# Patient Record
Sex: MALE | Race: OTHER | HISPANIC OR LATINO
[De-identification: names, ages, dates, MRNs, and addresses within clinical notes are randomized per-mention and may not be internally consistent; named-entity substitution may affect disease eponyms.]

---

## 2018-10-23 PROBLEM — Z00.00 ENCOUNTER FOR PREVENTIVE HEALTH EXAMINATION: Status: ACTIVE | Noted: 2018-10-23

## 2018-11-17 ENCOUNTER — APPOINTMENT (OUTPATIENT)
Dept: OPHTHALMOLOGY | Facility: CLINIC | Age: 53
End: 2018-11-17

## 2021-03-29 ENCOUNTER — INPATIENT (INPATIENT)
Facility: HOSPITAL | Age: 56
LOS: 0 days | Discharge: HOME | DRG: 177 | End: 2021-03-30
Attending: STUDENT IN AN ORGANIZED HEALTH CARE EDUCATION/TRAINING PROGRAM | Admitting: STUDENT IN AN ORGANIZED HEALTH CARE EDUCATION/TRAINING PROGRAM
Payer: MEDICAID

## 2021-03-29 VITALS
OXYGEN SATURATION: 96 % | TEMPERATURE: 103 F | SYSTOLIC BLOOD PRESSURE: 138 MMHG | WEIGHT: 149.91 LBS | DIASTOLIC BLOOD PRESSURE: 81 MMHG | HEART RATE: 97 BPM | RESPIRATION RATE: 18 BRPM | HEIGHT: 64 IN

## 2021-03-29 PROCEDURE — 99285 EMERGENCY DEPT VISIT HI MDM: CPT | Mod: 25

## 2021-03-29 PROCEDURE — 93010 ELECTROCARDIOGRAM REPORT: CPT

## 2021-03-30 ENCOUNTER — TRANSCRIPTION ENCOUNTER (OUTPATIENT)
Age: 56
End: 2021-03-30

## 2021-03-30 VITALS
DIASTOLIC BLOOD PRESSURE: 76 MMHG | SYSTOLIC BLOOD PRESSURE: 113 MMHG | OXYGEN SATURATION: 96 % | TEMPERATURE: 98 F | RESPIRATION RATE: 18 BRPM | HEART RATE: 88 BPM

## 2021-03-30 DIAGNOSIS — U07.1 COVID-19: ICD-10-CM

## 2021-03-30 DIAGNOSIS — J96.91 RESPIRATORY FAILURE, UNSPECIFIED WITH HYPOXIA: ICD-10-CM

## 2021-03-30 DIAGNOSIS — Z29.9 ENCOUNTER FOR PROPHYLACTIC MEASURES, UNSPECIFIED: ICD-10-CM

## 2021-03-30 DIAGNOSIS — J18.9 PNEUMONIA, UNSPECIFIED ORGANISM: ICD-10-CM

## 2021-03-30 LAB
ALBUMIN SERPL ELPH-MCNC: 4 G/DL — SIGNIFICANT CHANGE UP (ref 3.3–5)
ALP SERPL-CCNC: 66 U/L — SIGNIFICANT CHANGE UP (ref 40–120)
ALT FLD-CCNC: 61 U/L — HIGH (ref 10–45)
ANION GAP SERPL CALC-SCNC: 14 MMOL/L — SIGNIFICANT CHANGE UP (ref 5–17)
APTT BLD: 35.1 SEC — SIGNIFICANT CHANGE UP (ref 27.5–35.5)
AST SERPL-CCNC: 75 U/L — HIGH (ref 10–40)
BASE EXCESS BLDV CALC-SCNC: 4.6 MMOL/L — HIGH (ref -2–2)
BASOPHILS # BLD AUTO: 0.01 K/UL — SIGNIFICANT CHANGE UP (ref 0–0.2)
BASOPHILS NFR BLD AUTO: 0.2 % — SIGNIFICANT CHANGE UP (ref 0–2)
BILIRUB SERPL-MCNC: 0.4 MG/DL — SIGNIFICANT CHANGE UP (ref 0.2–1.2)
BUN SERPL-MCNC: 11 MG/DL — SIGNIFICANT CHANGE UP (ref 7–23)
CA-I SERPL-SCNC: 1.03 MMOL/L — LOW (ref 1.12–1.3)
CALCIUM SERPL-MCNC: 8.5 MG/DL — SIGNIFICANT CHANGE UP (ref 8.4–10.5)
CHLORIDE BLDV-SCNC: 105 MMOL/L — SIGNIFICANT CHANGE UP (ref 96–108)
CHLORIDE SERPL-SCNC: 96 MMOL/L — SIGNIFICANT CHANGE UP (ref 96–108)
CO2 BLDV-SCNC: 31 MMOL/L — HIGH (ref 22–30)
CO2 SERPL-SCNC: 25 MMOL/L — SIGNIFICANT CHANGE UP (ref 22–31)
CREAT SERPL-MCNC: 0.71 MG/DL — SIGNIFICANT CHANGE UP (ref 0.5–1.3)
CRP SERPL-MCNC: 96 MG/L — HIGH (ref 0–4)
D DIMER BLD IA.RAPID-MCNC: 221 NG/ML DDU — SIGNIFICANT CHANGE UP
EOSINOPHIL # BLD AUTO: 0 K/UL — SIGNIFICANT CHANGE UP (ref 0–0.5)
EOSINOPHIL NFR BLD AUTO: 0 % — SIGNIFICANT CHANGE UP (ref 0–6)
FERRITIN SERPL-MCNC: 1558 NG/ML — HIGH (ref 30–400)
GAS PNL BLDV: 135 MMOL/L — SIGNIFICANT CHANGE UP (ref 135–145)
GAS PNL BLDV: SIGNIFICANT CHANGE UP
GAS PNL BLDV: SIGNIFICANT CHANGE UP
GLUCOSE BLDV-MCNC: 118 MG/DL — HIGH (ref 70–99)
GLUCOSE SERPL-MCNC: 123 MG/DL — HIGH (ref 70–99)
HCO3 BLDV-SCNC: 29 MMOL/L — SIGNIFICANT CHANGE UP (ref 21–29)
HCT VFR BLD CALC: 42.6 % — SIGNIFICANT CHANGE UP (ref 39–50)
HCT VFR BLDA CALC: 48 % — SIGNIFICANT CHANGE UP (ref 39–50)
HGB BLD CALC-MCNC: 15.7 G/DL — SIGNIFICANT CHANGE UP (ref 13–17)
HGB BLD-MCNC: 14.8 G/DL — SIGNIFICANT CHANGE UP (ref 13–17)
IMM GRANULOCYTES NFR BLD AUTO: 0.5 % — SIGNIFICANT CHANGE UP (ref 0–1.5)
INR BLD: 1.04 RATIO — SIGNIFICANT CHANGE UP (ref 0.88–1.16)
LACTATE BLDV-MCNC: 1.4 MMOL/L — SIGNIFICANT CHANGE UP (ref 0.7–2)
LYMPHOCYTES # BLD AUTO: 0.68 K/UL — LOW (ref 1–3.3)
LYMPHOCYTES # BLD AUTO: 10.7 % — LOW (ref 13–44)
MCHC RBC-ENTMCNC: 30.6 PG — SIGNIFICANT CHANGE UP (ref 27–34)
MCHC RBC-ENTMCNC: 34.7 GM/DL — SIGNIFICANT CHANGE UP (ref 32–36)
MCV RBC AUTO: 88.2 FL — SIGNIFICANT CHANGE UP (ref 80–100)
MONOCYTES # BLD AUTO: 0.32 K/UL — SIGNIFICANT CHANGE UP (ref 0–0.9)
MONOCYTES NFR BLD AUTO: 5 % — SIGNIFICANT CHANGE UP (ref 2–14)
NEUTROPHILS # BLD AUTO: 5.3 K/UL — SIGNIFICANT CHANGE UP (ref 1.8–7.4)
NEUTROPHILS NFR BLD AUTO: 83.6 % — HIGH (ref 43–77)
NRBC # BLD: 0 /100 WBCS — SIGNIFICANT CHANGE UP (ref 0–0)
PCO2 BLDV: 45 MMHG — SIGNIFICANT CHANGE UP (ref 42–55)
PH BLDV: 7.43 — SIGNIFICANT CHANGE UP (ref 7.35–7.45)
PLATELET # BLD AUTO: 126 K/UL — LOW (ref 150–400)
PO2 BLDV: <20 MMHG — LOW (ref 25–45)
POTASSIUM BLDV-SCNC: 3.1 MMOL/L — LOW (ref 3.5–5.3)
POTASSIUM SERPL-MCNC: 3.3 MMOL/L — LOW (ref 3.5–5.3)
POTASSIUM SERPL-MCNC: 4 MMOL/L — SIGNIFICANT CHANGE UP (ref 3.5–5.3)
POTASSIUM SERPL-SCNC: 3.3 MMOL/L — LOW (ref 3.5–5.3)
POTASSIUM SERPL-SCNC: 4 MMOL/L — SIGNIFICANT CHANGE UP (ref 3.5–5.3)
PROCALCITONIN SERPL-MCNC: 0.14 NG/ML — HIGH (ref 0.02–0.1)
PROT SERPL-MCNC: 7.2 G/DL — SIGNIFICANT CHANGE UP (ref 6–8.3)
PROTHROM AB SERPL-ACNC: 12.5 SEC — SIGNIFICANT CHANGE UP (ref 10.6–13.6)
RBC # BLD: 4.83 M/UL — SIGNIFICANT CHANGE UP (ref 4.2–5.8)
RBC # FLD: 11.6 % — SIGNIFICANT CHANGE UP (ref 10.3–14.5)
SAO2 % BLDV: 28 % — LOW (ref 67–88)
SARS-COV-2 RNA SPEC QL NAA+PROBE: DETECTED
SODIUM SERPL-SCNC: 135 MMOL/L — SIGNIFICANT CHANGE UP (ref 135–145)
TROPONIN T, HIGH SENSITIVITY RESULT: <6 NG/L — SIGNIFICANT CHANGE UP (ref 0–51)
WBC # BLD: 6.34 K/UL — SIGNIFICANT CHANGE UP (ref 3.8–10.5)
WBC # FLD AUTO: 6.34 K/UL — SIGNIFICANT CHANGE UP (ref 3.8–10.5)

## 2021-03-30 PROCEDURE — 82803 BLOOD GASES ANY COMBINATION: CPT

## 2021-03-30 PROCEDURE — 85610 PROTHROMBIN TIME: CPT

## 2021-03-30 PROCEDURE — 80053 COMPREHEN METABOLIC PANEL: CPT

## 2021-03-30 PROCEDURE — 87040 BLOOD CULTURE FOR BACTERIA: CPT

## 2021-03-30 PROCEDURE — 86140 C-REACTIVE PROTEIN: CPT

## 2021-03-30 PROCEDURE — 85018 HEMOGLOBIN: CPT

## 2021-03-30 PROCEDURE — 84145 PROCALCITONIN (PCT): CPT

## 2021-03-30 PROCEDURE — 84295 ASSAY OF SERUM SODIUM: CPT

## 2021-03-30 PROCEDURE — 82435 ASSAY OF BLOOD CHLORIDE: CPT

## 2021-03-30 PROCEDURE — 71045 X-RAY EXAM CHEST 1 VIEW: CPT | Mod: 26

## 2021-03-30 PROCEDURE — 99285 EMERGENCY DEPT VISIT HI MDM: CPT | Mod: 25

## 2021-03-30 PROCEDURE — 87635 SARS-COV-2 COVID-19 AMP PRB: CPT

## 2021-03-30 PROCEDURE — 85730 THROMBOPLASTIN TIME PARTIAL: CPT

## 2021-03-30 PROCEDURE — 82330 ASSAY OF CALCIUM: CPT

## 2021-03-30 PROCEDURE — 85025 COMPLETE CBC W/AUTO DIFF WBC: CPT

## 2021-03-30 PROCEDURE — 12345: CPT | Mod: NC

## 2021-03-30 PROCEDURE — 84484 ASSAY OF TROPONIN QUANT: CPT

## 2021-03-30 PROCEDURE — 99223 1ST HOSP IP/OBS HIGH 75: CPT

## 2021-03-30 PROCEDURE — 85379 FIBRIN DEGRADATION QUANT: CPT

## 2021-03-30 PROCEDURE — 71045 X-RAY EXAM CHEST 1 VIEW: CPT

## 2021-03-30 PROCEDURE — 85014 HEMATOCRIT: CPT

## 2021-03-30 PROCEDURE — 82728 ASSAY OF FERRITIN: CPT

## 2021-03-30 PROCEDURE — 83605 ASSAY OF LACTIC ACID: CPT

## 2021-03-30 PROCEDURE — 84132 ASSAY OF SERUM POTASSIUM: CPT

## 2021-03-30 PROCEDURE — 82947 ASSAY GLUCOSE BLOOD QUANT: CPT

## 2021-03-30 PROCEDURE — 96374 THER/PROPH/DIAG INJ IV PUSH: CPT

## 2021-03-30 RX ORDER — REMDESIVIR 5 MG/ML
INJECTION INTRAVENOUS
Refills: 0 | Status: DISCONTINUED | OUTPATIENT
Start: 2021-03-30 | End: 2021-03-30

## 2021-03-30 RX ORDER — GUAIFENESIN/DEXTROMETHORPHAN 600MG-30MG
10 TABLET, EXTENDED RELEASE 12 HR ORAL
Qty: 0 | Refills: 0 | DISCHARGE
Start: 2021-03-30

## 2021-03-30 RX ORDER — REMDESIVIR 5 MG/ML
200 INJECTION INTRAVENOUS EVERY 24 HOURS
Refills: 0 | Status: DISCONTINUED | OUTPATIENT
Start: 2021-03-30 | End: 2021-03-30

## 2021-03-30 RX ORDER — GUAIFENESIN/DEXTROMETHORPHAN 600MG-30MG
10 TABLET, EXTENDED RELEASE 12 HR ORAL EVERY 4 HOURS
Refills: 0 | Status: DISCONTINUED | OUTPATIENT
Start: 2021-03-30 | End: 2021-03-30

## 2021-03-30 RX ORDER — CEFTRIAXONE 500 MG/1
1000 INJECTION, POWDER, FOR SOLUTION INTRAMUSCULAR; INTRAVENOUS ONCE
Refills: 0 | Status: COMPLETED | OUTPATIENT
Start: 2021-03-30 | End: 2021-03-30

## 2021-03-30 RX ORDER — AZITHROMYCIN 500 MG/1
500 TABLET, FILM COATED ORAL ONCE
Refills: 0 | Status: COMPLETED | OUTPATIENT
Start: 2021-03-30 | End: 2021-03-30

## 2021-03-30 RX ORDER — ACETAMINOPHEN 500 MG
2 TABLET ORAL
Qty: 0 | Refills: 0 | DISCHARGE
Start: 2021-03-30

## 2021-03-30 RX ORDER — POTASSIUM CHLORIDE 20 MEQ
40 PACKET (EA) ORAL ONCE
Refills: 0 | Status: COMPLETED | OUTPATIENT
Start: 2021-03-30 | End: 2021-03-30

## 2021-03-30 RX ORDER — KETOROLAC TROMETHAMINE 30 MG/ML
30 SYRINGE (ML) INJECTION ONCE
Refills: 0 | Status: DISCONTINUED | OUTPATIENT
Start: 2021-03-30 | End: 2021-03-30

## 2021-03-30 RX ORDER — DEXAMETHASONE 0.5 MG/5ML
1 ELIXIR ORAL
Qty: 4 | Refills: 0
Start: 2021-03-30 | End: 2021-04-02

## 2021-03-30 RX ORDER — DEXAMETHASONE 0.5 MG/5ML
6 ELIXIR ORAL DAILY
Refills: 0 | Status: DISCONTINUED | OUTPATIENT
Start: 2021-03-30 | End: 2021-03-30

## 2021-03-30 RX ORDER — ENOXAPARIN SODIUM 100 MG/ML
40 INJECTION SUBCUTANEOUS DAILY
Refills: 0 | Status: DISCONTINUED | OUTPATIENT
Start: 2021-03-30 | End: 2021-03-30

## 2021-03-30 RX ORDER — ACETAMINOPHEN 500 MG
650 TABLET ORAL EVERY 4 HOURS
Refills: 0 | Status: DISCONTINUED | OUTPATIENT
Start: 2021-03-30 | End: 2021-03-30

## 2021-03-30 RX ORDER — REMDESIVIR 5 MG/ML
100 INJECTION INTRAVENOUS EVERY 24 HOURS
Refills: 0 | Status: DISCONTINUED | OUTPATIENT
Start: 2021-03-31 | End: 2021-03-30

## 2021-03-30 RX ORDER — DEXAMETHASONE 0.5 MG/5ML
6 ELIXIR ORAL ONCE
Refills: 0 | Status: COMPLETED | OUTPATIENT
Start: 2021-03-30 | End: 2021-03-30

## 2021-03-30 RX ADMIN — AZITHROMYCIN 250 MILLIGRAM(S): 500 TABLET, FILM COATED ORAL at 04:25

## 2021-03-30 RX ADMIN — Medication 30 MILLIGRAM(S): at 03:47

## 2021-03-30 RX ADMIN — Medication 6 MILLIGRAM(S): at 01:14

## 2021-03-30 RX ADMIN — Medication 40 MILLIEQUIVALENT(S): at 03:47

## 2021-03-30 RX ADMIN — CEFTRIAXONE 100 MILLIGRAM(S): 500 INJECTION, POWDER, FOR SOLUTION INTRAMUSCULAR; INTRAVENOUS at 03:46

## 2021-03-30 RX ADMIN — Medication 6 MILLIGRAM(S): at 06:12

## 2021-03-30 NOTE — DISCHARGE NOTE PROVIDER - NSDCACTIVITY_GEN_ALL_CORE
Bathing allowed/Do not drive or operate machinery/Showering allowed/Stairs allowed/No heavy lifting/straining

## 2021-03-30 NOTE — ED ADULT NURSE NOTE - OBJECTIVE STATEMENT
54 yo M presents to ED with complaints of SOB, fever, chills, fatigue. No known past medical history. Pt tested positive for COVID on 3/23, and states his symptoms have been getting worse, not better. Pt has a headache, worsening shortness of breath with ambulation. Pt has been febrile, taking tylenol at home. Denies chest pain, nausea, vomiting, diarrhea. Pt placed on cardiac and SpO2 monitor. IV placed, labs drawn. Ambulatory SpO2 performed, oxygen saturation dropped to 90% with ambulation. Bed locked in lowest position, pt given call bell. Airborne precautions maintained.

## 2021-03-30 NOTE — DISCHARGE NOTE PROVIDER - NSDCMRMEDTOKEN_GEN_ALL_CORE_FT
acetaminophen 325 mg oral tablet: 2 tab(s) orally every 4 hours, As needed, Temp greater or equal to 38.5C (101.3F)  dexamethasone 6 mg oral tablet: 1 tab(s) orally once a day starting 3/31 MDD:1  guaifenesin-dextromethorphan 100 mg-10 mg/5 mL oral liquid: 10 milliliter(s) orally every 4 hours, As needed, Cough

## 2021-03-30 NOTE — H&P ADULT - NSHPPHYSICALEXAM_GEN_ALL_CORE
Vital Signs Last 24 Hrs  T(C): 37.2 (30 Mar 2021 04:41), Max: 39.3 (29 Mar 2021 23:08)  T(F): 98.9 (30 Mar 2021 04:41), Max: 102.8 (29 Mar 2021 23:08)  HR: 86 (30 Mar 2021 04:41) (86 - 102)  BP: 112/69 (30 Mar 2021 04:41) (104/75 - 138/81)  BP(mean): --  RR: 22 (30 Mar 2021 04:41) (18 - 24)  SpO2: 96% (30 Mar 2021 04:41) (90% - 98%)    GENERAL: No acute distress, well-developed  HEAD:  Atraumatic, Normocephalic  ENT: EOMI, PERRLA, conjunctiva and sclera clear,  moist mucosa no pharyngeal erythema or exudates   NECK: supple , no JVD   CHEST/LUNG: Clear to auscultation bilaterally; No wheeze, equal breath sounds bilaterally   BACK: No spinal tenderness,  No CVA tenderness   HEART: Regular rate and rhythm; No murmurs, rubs, or gallops  ABDOMEN: Soft, Nontender, Nondistended; Bowel sounds present  EXTREMITIES:  No clubbing, cyanosis, or edema  MSK: No joint swelling or effusions, ROM intact   PSYCH: Normal behavior/affect  NEUROLOGY: AAOx3, non-focal, cranial nerves intact  SKIN: Normal color, No rashes or lesions

## 2021-03-30 NOTE — H&P ADULT - PROBLEM SELECTOR PLAN 3
procal not markedly elevated more c/w covid , will obtain pa/lat to better clarify lesion , s/p ceftriaxone and azithromycin   - f/u PA/LAt CXR, if suggestive of bacterial pna , then can continue ABX

## 2021-03-30 NOTE — H&P ADULT - PROBLEM SELECTOR PLAN 2
-Continue Dexamethaone 6mg x 10 day course  - start Remdesivir   - monitor liver tests   - Airborne + contact Isolation   - Observe off antibiotics   - Guaifenesin/ dextromethorphan PRN

## 2021-03-30 NOTE — DISCHARGE NOTE NURSING/CASE MANAGEMENT/SOCIAL WORK - NSDPDISTO_GEN_ALL_CORE
[facility] at bedside.   
Dr. Rosario at bedside speaking to patient. [place] [employer] x 1 at bedside. [facility] has been contacted regarding patient's level of consciousness.   
Patient has been placed on an Emergency halfway per ACS.   
Patient not tolerating oral charcoal well. Holding charcoal in her mouth for extended periods of time. After initial sips of charcoal swallowed, patient seemed to be gagging as if she were going to vomit. Patient denies nausea. Dr. Julian notified.   
When patient is ready for  discharge, [facility]. needs to be contacted.   
Home

## 2021-03-30 NOTE — ED ADULT NURSE NOTE - NSIMPLEMENTINTERV_GEN_ALL_ED
Implemented All Universal Safety Interventions:  [place] to call system. Call bell, personal items and telephone within reach. Instruct patient to call for assistance. Room bathroom lighting operational. Non-slip footwear when patient is off stretcher. Physically safe environment: no spills, clutter or unnecessary equipment. Stretcher in lowest position, wheels locked, appropriate side rails in place.

## 2021-03-30 NOTE — ED PROVIDER NOTE - OBJECTIVE STATEMENT
Pt is a 56 yo prev healthy M who presents with COVID and SOB. Started having symptoms about 1 week ago, positive COVID test on 3/23. Pt has been taking Tylenol daily but endorses worsening fevers, chills, SOB, cough, myalgias, fatigue, decreased appetite, mild nausea and general mild abd cramping. Lives with wife and daughter, moved in with brother when he was covid positive, but since he has been getting worse, brother took him to ED. pt denies smoking, alcohol, or drugs Pt is a 54 yo prev healthy M who presents with COVID and SOB. Started having symptoms about 1 week ago, positive COVID test on 3/23. Pt has been taking Tylenol daily but endorses worsening fevers, chills, SOB, cough, myalgias, fatigue, decreased appetite, mild nausea and general mild abd cramping. Lives with wife and daughter, moved in with brother when he was covid positive, but since he has been getting worse, brother took him to ED. pt denies smoking, alcohol, or drugs   [id number]

## 2021-03-30 NOTE — DISCHARGE NOTE PROVIDER - NSDCCPCAREPLAN_GEN_ALL_CORE_FT
PRINCIPAL DISCHARGE DIAGNOSIS  Diagnosis: COVID-19  Assessment and Plan of Treatment:       SECONDARY DISCHARGE DIAGNOSES  Diagnosis: Pneumonia of left lower lobe due to infectious organism  Assessment and Plan of Treatment:

## 2021-03-30 NOTE — ED PROVIDER NOTE - ATTENDING CONTRIBUTION TO CARE
pt with known covid here with worsening cough, productive, along with fatigue, woodson. on exam, pt is well appearing, NAD, febrile, borderline tachycardic, saturating in mid 90s on RA at rest but desaturates to 90 with ambulation. workup shows unremarkable labs, however, CXR shows consolidation in L lower lobe concerning for superimposed bacterial pneumonia. while pt is not toxic, given covid, worsening sx, and cxr finding, will admit for further care. pt given abx to cover CAP as well as decadron due to hypoxia and covid.

## 2021-03-30 NOTE — H&P ADULT - NSHPSOCIALHISTORY_GEN_ALL_CORE
Social History:    Marital Status:  ( x  )    (   ) Single    (   )    (  )   Occupation:   Lives with: (  ) alone  ( x ) children   ( x ) spouse   (  ) parents  (  ) other    Substance Use (street drugs): ( x ) never used  (  ) other:  Tobacco Usage:  ( x  ) never smoked   (   ) former smoker   (   ) current smoker  (     ) pack year  (        ) last cigarette date  Alcohol Usage: no etoh use

## 2021-03-30 NOTE — ED PROVIDER NOTE - CLINICAL SUMMARY MEDICAL DECISION MAKING FREE TEXT BOX
54 yo prev healthy M who presents with COVID and SOB for 1 week. Ambulatory sat 90 on RA. Will check covid labs, cbc, cmp, ekg, cxr, trop and give dexamethasone

## 2021-03-30 NOTE — DISCHARGE NOTE NURSING/CASE MANAGEMENT/SOCIAL WORK - PATIENT PORTAL LINK FT
You can access the FollowMyHealth Patient Portal offered by [facility] by registering at the following website: [email or web address]. By joining [facility]’s FollowMyHealth portal, you will also be able to view your health information using other applications (apps) compatible with our system.

## 2021-03-30 NOTE — ED PROVIDER NOTE - CARE PLAN
Principal Discharge DX:	COVID-19  Secondary Diagnosis:	Pneumonia of left lower lobe due to infectious organism

## 2021-03-30 NOTE — ED PROVIDER NOTE - NS ED ROS FT
GENERAL: +fever, chills  EYES: no vision changes, no discharge.   ENT: no difficulty swallowing or speaking   CARDIAC: no chest pain/pressure, +SOB, no lower extremity swelling  PULMONARY: +cough, +SOB  GI: +abdominal pain, +nausea  : no dysuria  SKIN: no rashes  NEURO: no headache, lightheadedness, paresthesia  MSK: No joint pain, +myalgia, +fatigue

## 2021-03-30 NOTE — DISCHARGE NOTE PROVIDER - HOSPITAL COURSE
Patient was admitted for acute hypoxic respiratory failure (SpO2 was 90% on room with ambulation) due to covid pneumonia. He was given 1 dose of treatment for community acquired pneumonia and 1 dose of remdesivir and decadron. However, due to low suspicion for community acquired pneumonia, and low procal, antibiotics were discontinued. Patient felt a little bit better, and in the morning he was able to walk without desaturating below 94% on room air. Patient given instructions for post-discharge care. He will follow up with his primary care doctor.

## 2021-03-30 NOTE — H&P ADULT - NSHPLABSRESULTS_GEN_ALL_CORE
Labs personally reviewed:                          14.8   6.34  )-----------( 126      ( 30 Mar 2021 01:15 )             42.6     03-30    135  |  96  |  11  ----------------------------<  123<H>  3.3<L>   |  25  |  0.71    Ca    8.5      30 Mar 2021 01:15    TPro  7.2  /  Alb  4.0  /  TBili  0.4  /  DBili  x   /  AST  75<H>  /  ALT  61<H>  /  AlkPhos  66  03-30        LIVER FUNCTIONS - ( 30 Mar 2021 01:15 )  Alb: 4.0 g/dL / Pro: 7.2 g/dL / ALK PHOS: 66 U/L / ALT: 61 U/L / AST: 75 U/L / GGT: x           PT/INR - ( 30 Mar 2021 01:15 )   PT: 12.5 sec;   INR: 1.04 ratio         PTT - ( 30 Mar 2021 01:15 )  PTT:35.1 sec    CAPILLARY BLOOD GLUCOSE          Imaging:  CXR personally reviewed:Bilateral patchy opacities, left greater than right and predominantly involving the periphery. Findings may be secondary to infection, including COVID pneumonia    EKG personally reviewed: normal sinus rhythm at 91 bpm

## 2021-03-30 NOTE — CHART NOTE - NSCHARTNOTEFT_GEN_A_CORE
Patient seen and examined this morning. SpO2 at rest is 96% on room air, with ambulation went as low as 94%. Patient agreeable to being discharged home. Spoke with him about his symptoms and course of illness - he is at about day 7-8 of illness, should start to feel better in a few days. Advised patient that if he starts to feel worse that he could come back to the ER to be evaluated again. Instructed him to take tylenol prn fevers and myalgias and robitussin for cough and phlegm. He is medically cleared for discharge. 35 minutes spent coordinating discharge.

## 2021-03-30 NOTE — H&P ADULT - NSHPREVIEWOFSYSTEMS_GEN_ALL_CORE
CONSTITUTIONAL: +  weakness, + fevers +chills  EYES/ENT: No visual changes;  No dysphagia  NECK: No pain or stiffness  RESPIRATORY:+  cough, no  wheezing, no  hemoptysis; + shortness of breath  CARDIOVASCULAR: No chest pain or palpitations; No lower extremity edema  EXTREMITIES: no le edema, cyanosis, clubbing  GASTROINTESTINAL: + abdominal  pain. + nausea, no vomiting, or hematemesis; No diarrhea or constipation. No melena or hematochezia.  BACK: No back pain  GENITOURINARY: No dysuria, frequency or hematuria  NEUROLOGICAL: No numbness or weakness  MSK: no joint swelling or pain  SKIN: No itching, burning, rashes, or lesions   PSYCH: no agitation  All other review of systems is negative unless indicated above.

## 2021-03-30 NOTE — ED PROVIDER NOTE - PHYSICAL EXAMINATION
Jud Collier MD  GENERAL: Patient awake alert   HEENT: NC/AT, Moist mucous membranes, PERRL, EOMI.  LUNGS: bilateral basilar crackles, easy wob, tachypneic and O2 90 when ambulating   CARDIAC: RRR, no m/r/g.    ABDOMEN: Soft, NT, ND, No rebound, guarding. No CVA tenderness.   EXT: No edema. No calf tenderness.   MSK: no pain with movement, no deformities.  NEURO: A&Ox3. Moving all extremities.  SKIN: Warm and dry. No rash.  PSYCH: Normal affect.

## 2021-03-30 NOTE — H&P ADULT - HISTORY OF PRESENT ILLNESS
Patient is a 55 year old  without significant medical history ,  recent COVID infection , presents for worsening shortness of breath. Patient reports symptoms began about one week prior to admission , he had a  positive COVID test on 3/23.  He reports intermittent fever , chills , myalgias and fatigue , as well as productive  nonbloody cough with white sputum  associated with shortness of breath. He has been taking Tylenol daily. Patient also reports a decreased appetite, mild nausea  but no vomiting. He reports abdominal pain with coughing.

## 2021-04-04 LAB
CULTURE RESULTS: SIGNIFICANT CHANGE UP
CULTURE RESULTS: SIGNIFICANT CHANGE UP
SPECIMEN SOURCE: SIGNIFICANT CHANGE UP
SPECIMEN SOURCE: SIGNIFICANT CHANGE UP

## 2025-05-06 NOTE — ED ADULT NURSE NOTE - NSFALLRSKUNASSIST_ED_ALL_ED
Robb Gray   Patient is a 39 y.o. year old male who presents with:  Chief Complaint   Patient presents with    Annual Exam    Ear Fullness     Ears feels clogged when bending over or going up a flight of steps, intermittent        History of Present Illness  The patient is a 39-year-old male who presents for an annual wellness visit and follow-up on hypertension.    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 He anticipates securing new employment and insurance by the end of the year. He is currently paying out of pocket for his medical expenses. He has been diligent in taking his prescribed medications, including lisinopril, chlorthalidone, amlodipine, and Effexor, and can discern when he has missed a dose. He has not been able to monitor his blood pressure at home due to misplacing his equipment during a move. His blood pressure was recorded as 152 during a dental procedure in 03/2025, which has raised concerns about the efficacy of his current medication regimen.    He reports experiencing a sensation of fullness in his ears when ascending stairs or bending over, a symptom that has recurred intermittently over the past six months. This symptom had previously resolved but has recently re-emerged. He is uncertain if this could be related to changes in air pressure within his home. The sensation is bilateral and is temporarily relieved by holding his nose and blowing, causing his ears to pop. He does not experience any associated dizziness, vertigo, or significant hearing loss, although he does report some muffled hearing. He also does not report any ear pain.    Results         Review of Systems    Health Maintenance Due   Topic Date Due    Varicella vaccine (1 of 2 - 13+ 2-dose series) Never done    Hepatitis B vaccine (1 of 3 - 19+ 3-dose series) Never done    DTaP/Tdap/Td vaccine (1 - Tdap) Never done    COVID-19 Vaccine  concern for hemarthrosis/hematoma in setting of thrombocytopenia.  R knee pain better, still with some stiffness, no sign of infection on exam.   Ultrasound of right knee pending. no